# Patient Record
Sex: MALE | Race: WHITE | Employment: FULL TIME | URBAN - NONMETROPOLITAN AREA
[De-identification: names, ages, dates, MRNs, and addresses within clinical notes are randomized per-mention and may not be internally consistent; named-entity substitution may affect disease eponyms.]

---

## 2019-08-14 ENCOUNTER — HOSPITAL ENCOUNTER (EMERGENCY)
Age: 29
Discharge: HOME | End: 2019-08-14
Attending: EMERGENCY MEDICINE
Payer: OTHER GOVERNMENT

## 2019-08-14 VITALS
DIASTOLIC BLOOD PRESSURE: 71 MMHG | SYSTOLIC BLOOD PRESSURE: 103 MMHG | TEMPERATURE: 97.4 F | RESPIRATION RATE: 16 BRPM | WEIGHT: 195 LBS | BODY MASS INDEX: 25.84 KG/M2 | OXYGEN SATURATION: 100 % | HEART RATE: 70 BPM | HEIGHT: 73 IN

## 2019-08-14 DIAGNOSIS — H60.501 ACUTE OTITIS EXTERNA OF RIGHT EAR, UNSPECIFIED TYPE: Primary | ICD-10-CM

## 2019-08-14 PROCEDURE — 99282 EMERGENCY DEPT VISIT SF MDM: CPT

## 2019-08-14 PROCEDURE — 6370000000 HC RX 637 (ALT 250 FOR IP)

## 2019-08-14 RX ORDER — IBUPROFEN 200 MG
600 TABLET ORAL ONCE
Status: COMPLETED | OUTPATIENT
Start: 2019-08-14 | End: 2019-08-14

## 2019-08-14 RX ORDER — IBUPROFEN 200 MG
TABLET ORAL
Status: COMPLETED
Start: 2019-08-14 | End: 2019-08-14

## 2019-08-14 RX ADMIN — IBUPROFEN 600 MG: 200 TABLET, FILM COATED ORAL at 02:10

## 2019-08-14 RX ADMIN — Medication 600 MG: at 02:10

## 2019-08-14 ASSESSMENT — PAIN DESCRIPTION - DESCRIPTORS: DESCRIPTORS: ACHING

## 2019-08-14 ASSESSMENT — PAIN DESCRIPTION - FREQUENCY: FREQUENCY: CONTINUOUS

## 2019-08-14 ASSESSMENT — PAIN DESCRIPTION - PAIN TYPE: TYPE: ACUTE PAIN

## 2019-08-14 ASSESSMENT — PAIN SCALES - GENERAL: PAINLEVEL_OUTOF10: 7

## 2019-08-14 ASSESSMENT — PAIN DESCRIPTION - ORIENTATION: ORIENTATION: RIGHT

## 2019-08-14 NOTE — ED NOTES
Pt walked into ED c/c of right otalgia pain and there is redness in his right ear. Pt also complains of right jaw and temporal pain. Pt has only take Tylenol for pain and pain has been there for x3 days and is now so unbearable that he is unable to sleep. VSS. Call light within reach.       Nisha Carreon RN  08/14/19 [id number]

## 2024-12-21 ENCOUNTER — OFFICE VISIT (OUTPATIENT)
Dept: URGENT CARE | Age: 34
End: 2024-12-21
Payer: OTHER GOVERNMENT

## 2024-12-21 VITALS
RESPIRATION RATE: 16 BRPM | SYSTOLIC BLOOD PRESSURE: 116 MMHG | WEIGHT: 221.78 LBS | OXYGEN SATURATION: 99 % | HEART RATE: 70 BPM | DIASTOLIC BLOOD PRESSURE: 79 MMHG | BODY MASS INDEX: 29.26 KG/M2

## 2024-12-21 DIAGNOSIS — H10.32 ACUTE BACTERIAL CONJUNCTIVITIS OF LEFT EYE: Primary | ICD-10-CM

## 2024-12-21 RX ORDER — OFLOXACIN 3 MG/ML
1 SOLUTION/ DROPS OPHTHALMIC 4 TIMES DAILY
Qty: 5 ML | Refills: 0 | Status: SHIPPED | OUTPATIENT
Start: 2024-12-21 | End: 2024-12-28

## 2024-12-21 ASSESSMENT — ENCOUNTER SYMPTOMS
VOMITING: 0
SORE THROAT: 0
COUGH: 0
CHILLS: 0
PHOTOPHOBIA: 0
ABDOMINAL PAIN: 0
FATIGUE: 0
FEVER: 0
EYE DISCHARGE: 1
DIZZINESS: 0
CHEST TIGHTNESS: 0
EYE PAIN: 0
EYE REDNESS: 1
SHORTNESS OF BREATH: 0
DIARRHEA: 0
SINUS PRESSURE: 0
EYE ITCHING: 0

## 2024-12-21 ASSESSMENT — VISUAL ACUITY: OU: 1

## 2024-12-21 NOTE — PROGRESS NOTES
"Subjective   Patient ID: Arnaud Peoples is a 34 y.o. male. They present today with a chief complaint of Eye Problem (L eye-- pink, this morning  had stuff in it and more pink. ).    History of Present Illness  Patient is a 34-year-old male presenting to the clinic with complaints of \"bacterial conjunctivitis\".  Patient states he has had symptoms since yesterday.  Patient states yesterday he woke up with erythema of the conjunctiva over the left side.  Patient states he had some discharge in the morning and was able to clear it away.  Patient states feels similar to prior bacterial conjunctivitis that he had 3 to 4 months ago.  Patient states he has mild irritation of the eye but no pain.  Patient states he woke up this morning with more discharge that he had to clear.  Patient denies any visual deficits.  Patient denies any pain.  Associated erythema, drainage.      History provided by:  Patient  Eye Problem  Associated symptoms: discharge and redness    Associated symptoms: no itching, no photophobia and no vomiting        Past Medical History  Allergies as of 12/21/2024    (No Known Allergies)       (Not in a hospital admission)       History reviewed. No pertinent past medical history.    History reviewed. No pertinent surgical history.         Review of Systems  Review of Systems   Constitutional:  Negative for chills, fatigue and fever.   HENT:  Negative for congestion, ear discharge, ear pain, postnasal drip, sinus pressure and sore throat.    Eyes:  Positive for discharge and redness. Negative for photophobia, pain, itching and visual disturbance.   Respiratory:  Negative for cough, chest tightness and shortness of breath.    Cardiovascular:  Negative for chest pain.   Gastrointestinal:  Negative for abdominal pain, diarrhea and vomiting.   Neurological:  Negative for dizziness.                                  Objective    Vitals:    12/21/24 1115   BP: 116/79   BP Location: Left arm   Patient Position: " Sitting   BP Cuff Size: Adult   Pulse: 70   Resp: 16   SpO2: 99%   Weight: 101 kg (221 lb 12.5 oz)     No LMP for male patient.    Physical Exam  Vitals reviewed.   Constitutional:       General: He is awake. He is not in acute distress.     Appearance: Normal appearance. He is well-developed. He is not ill-appearing or toxic-appearing.   HENT:      Head: Normocephalic and atraumatic.      Mouth/Throat:      Mouth: Mucous membranes are moist.   Eyes:      General: Vision grossly intact.         Right eye: No discharge.      Extraocular Movements: Extraocular movements intact.      Pupils: Pupils are equal, round, and reactive to light.      Comments: Extraocular movements are intact bilaterally.  Pupils are equal round and reactive to light accommodation bilaterally.  Left eye with medial conjunctival erythema.  No active drainage in the clinic.  No signs of foreign bodies.  No signs of corneal damage. Right eye normal   Cardiovascular:      Rate and Rhythm: Normal rate and regular rhythm.      Heart sounds: Normal heart sounds, S1 normal and S2 normal. No murmur heard.     No friction rub. No gallop.   Pulmonary:      Effort: Pulmonary effort is normal. No respiratory distress.      Breath sounds: Normal breath sounds. No stridor. No wheezing, rhonchi or rales.   Skin:     General: Skin is warm.   Neurological:      General: No focal deficit present.      Mental Status: He is alert and oriented to person, place, and time. Mental status is at baseline.      Gait: Gait is intact.   Psychiatric:         Mood and Affect: Mood normal.         Behavior: Behavior normal. Behavior is cooperative.         Procedures    Point of Care Test & Imaging Results from this visit  No results found for this visit on 12/21/24.   No results found.    Diagnostic study results (if any) were reviewed by [facility].    Assessment/Plan   Allergies, medications, history, and pertinent labs/EKGs/Imaging reviewed by Mu Gustafson  "DARRELL.     Medical Decision Making  Patient is a 34-year-old male presenting to the clinic with complaints of \"bacterial conjunctivitis\".  Patient states he has had symptoms since yesterday.  Patient states yesterday he woke up with erythema of the conjunctiva over the left side.  Patient states he had some discharge in the morning and was able to clear it away.  Patient states feels similar to prior bacterial conjunctivitis that he had 3 to 4 months ago.  Patient states he has mild irritation of the eye but no pain.  Patient states he woke up this morning with more discharge that he had to clear.  Patient denies any visual deficits.  Patient denies any pain.  Associated erythema, drainage.  Vital signs in the clinic are stable.  Physical examination as above.  Extraocular movements are intact bilaterally.  Pupils are equal round and reactive to light accommodation bilaterally.  Left eye with medial conjunctival erythema.  No active drainage in the clinic.  No signs of foreign bodies.  No signs of corneal damage.  Likely bacterial conjunctivitis given symptoms and onset.  Patient to be covered with ofloxacin drops. Discharge instructions. Please follow up with your Primary Care Physician within the next 5-7 days. It is important to take prescriptions as prescribed and complete all antibiotics. If your symptoms worsen you are instructed to immediately go to the emergency room for reevaluation and further assessment. If you develop any chest pain, SOB, or difficulty breathing you are instructed to go to the emergency room for reevaluation. All discharge instructions will be provided and explained to the patient at discharge. If you have any questions regarding your treatment plan please call the [facility] urgent care clinic.     Orders and Diagnoses  There are no diagnoses linked to this encounter.    Medical Admin Record      Patient disposition: Home    Electronically signed by [facility]  11:47 " AM

## 2024-12-21 NOTE — PATIENT INSTRUCTIONS
Discharge instructions    Please follow up with your Primary Care Physician within the next 5-7 days.    It is important to take prescriptions as prescribed and complete all antibiotics.     If your symptoms worsen you are instructed to immediately go to the emergency room for reevaluation and further assessment.    If you develop any chest pain, SOB, or difficulty breathing you are instructed to go to the emergency room for reevaluation.    All discharge instructions will be provided and explained to the patient at discharge.    If you have any questions regarding your treatment plan please call the [facility] urgent care clinic.